# Patient Record
Sex: MALE | Race: BLACK OR AFRICAN AMERICAN | Employment: PART TIME | URBAN - METROPOLITAN AREA
[De-identification: names, ages, dates, MRNs, and addresses within clinical notes are randomized per-mention and may not be internally consistent; named-entity substitution may affect disease eponyms.]

---

## 2017-06-13 ENCOUNTER — OFFICE VISIT (OUTPATIENT)
Dept: FAMILY MEDICINE CLINIC | Age: 22
End: 2017-06-13

## 2017-06-13 VITALS
OXYGEN SATURATION: 100 % | TEMPERATURE: 98.5 F | HEIGHT: 69 IN | SYSTOLIC BLOOD PRESSURE: 123 MMHG | DIASTOLIC BLOOD PRESSURE: 67 MMHG | WEIGHT: 185 LBS | RESPIRATION RATE: 17 BRPM | HEART RATE: 90 BPM | BODY MASS INDEX: 27.4 KG/M2

## 2017-06-13 DIAGNOSIS — Z29.9 PREVENTIVE MEASURE: ICD-10-CM

## 2017-06-13 DIAGNOSIS — Z11.1 ENCOUNTER FOR SCREENING FOR RESPIRATORY TUBERCULOSIS: ICD-10-CM

## 2017-06-13 DIAGNOSIS — Z23 ENCOUNTER FOR IMMUNIZATION: ICD-10-CM

## 2017-06-13 DIAGNOSIS — Z00.00 ANNUAL PHYSICAL EXAM: Primary | ICD-10-CM

## 2017-06-13 DIAGNOSIS — K50.918 CROHN'S DISEASE WITH OTHER COMPLICATION: ICD-10-CM

## 2017-06-13 NOTE — PROGRESS NOTES
Chief Complaint   Patient presents with    Follow-up     here for Hep B series     1. Have you been to the ER, urgent care clinic since your last visit? Hospitalized since your last visit? No    2. Have you seen or consulted any other health care providers outside of the [address] [place] [address] since your last visit? Include any pap smears or colon screening. No  Rukhsana Aguilar 1995 . male who presents for routine immunizations. he denies any symptoms , reactions or allergies that would exclude them from being immunized today. Risks and adverse reactions were discussed and the VIS was given to them. All questions were addressed. Order placed for Hepatitis B,  per Verbal Order from Dr Yajaira Stevens with read back. Patient was observed for appropriate time post injection. There were no reactions observed. Sanjiv Chong.  David Escalera LPN

## 2017-06-13 NOTE — PATIENT INSTRUCTIONS
1.) Stop by the office during the week of July 13th for second Hep B vaccination. You can come during Nurse's hours: 9 am to 12 om and 2 pm to 4 pm, Monday through Friday. 2.) Return in 6 months for regular follow up and for next follow appointment.

## 2017-06-13 NOTE — PROGRESS NOTES
Progress Note    Patient: Angela Cornelius MRN: [id number]  SSN: [id number]    YOB: 1995  Age: 20 y.o. Sex: male          Subjective:   Angela Cornelius is a 20 y.o. male who is here for follow up. 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 The patient sees Dr. Obed Block for GI management. The patient is on Euceris and Remicade for his Crohn's. The patient mentions that he would like to get the Hep B series performed. He denies any hospitalizations since last visit. The patient mentions that the last time he did a colonoscopy since 2011. He states that it has been a few years since his last colonoscopy. Patient does not mention any chest pain, abdominal pain or shortness of breath. Objective:     Past Medical History:   Diagnosis Date    [facility]         Vitals:    06/13/17 1313   BP: 123/67   Pulse: 90   Resp: 17   Temp: 98.5 °F (36.9 °C)   TempSrc: Oral   SpO2: 100%   Weight: 185 lb (83.9 kg)   Height: 5' 9\" (1.753 m)            Review of Systems:  Pertinent items are noted in the History of Present Illness. Physical Exam:   GENERAL: alert, cooperative, no distress, appears stated age  EYE: conjunctivae/corneas clear. PERRL, EOM's intact. Fundi benign  LYMPHATIC: Cervical, supraclavicular, and axillary nodes normal.   THROAT & NECK: normal and no erythema or exudates noted. LUNG: clear to auscultation bilaterally  HEART: regular rate and rhythm, S1, S2 normal, no murmur, click, rub or gallop  ABDOMEN: soft, non-tender. Bowel sounds normal. No masses,  no organomegaly  EXTREMITIES:  extremities normal, atraumatic, no cyanosis or edema  SKIN: Normal.  NEUROLOGIC: negative  PSYCHIATRIC: non focal    Lab/Data Review:  Labs ordered as noted below        Assessment:     1.) Annual Physical: No acute findings on exam. Labs ordered as noted below. 2.) Crohn's Disease: Patient will continue with GI.  I will also get Quantiferon GOLD and Hepatitis test.     Patient will return in 6 months for follow up.      Plan:     Orders Placed This Encounter    Hepatitis B vaccine, adult dosage, IM    CBC WITH AUTOMATED DIFF    METABOLIC PANEL, COMPREHENSIVE    HEMOGLOBIN A1C WITH EAG    LIPID CASCADE W/REFLEX APO B    TSH 3RD GENERATION    T4, FREE    VITAMIN D, 25 HYDROXY    HEP B SURFACE AG    QUANTIFERON TB GOLD(CLIENT INCUB.)    Omega-3 Fatty Acids (FISH OIL) 300 mg cap    budesonide (UCERIS) 2 mg/actuation foam           Signed By: Ghulam Varner DO     June 13, 2017

## 2017-06-13 NOTE — MR AVS SNAPSHOT
Visit Information Date & Time Provider Department Dept. Phone Encounter #  
 6/13/2017  1:00 PM [id number] Betty Martell 77 [id number] Follow-up Instructions Return in about 6 months (around 12/13/2017). Upcoming Health Maintenance Date Due DTaP/Tdap/Td series (1 - Tdap) 4/18/2015 INFLUENZA AGE 9 TO ADULT 8/1/2017 Allergies as of 6/13/2017  Review Complete On: 4/95/6772 By: Uziel Mehta. Bubba Lutheran, LPN No Known Allergies Current Immunizations  Never Reviewed No immunizations on file. Not reviewed this visit You Were Diagnosed With   
  
 Codes Comments Annual physical exam    -  Primary ICD-10-CM: Z00.00 ICD-9-CM: V70.0 Crohn's disease with other complication ([facility]     HTQ-91-KR: G95.078 ICD-9-CM: 555.9 Encounter for screening for respiratory tuberculosis     ICD-10-CM: Z11.1 ICD-9-CM: V74.1 Preventive measure     ICD-10-CM: Z29.9 ICD-9-CM: V07.9 Encounter for immunization     ICD-10-CM: T09 ICD-9-CM: V03.89 Vitals BP Pulse Temp Resp Height(growth percentile) Weight(growth percentile) 123/67 (BP 1 Location: Right arm, BP Patient Position: Sitting) 90 98.5 °F (36.9 °C) (Oral) 17 5' 9\" (1.753 m) 185 lb (83.9 kg) SpO2 BMI Smoking Status 100% 27.32 kg/m2 Never Smoker BMI and BSA Data Body Mass Index Body Surface Area  
 27.32 kg/m 2 2.02 m 2 Preferred Pharmacy Pharmacy Name Phone [address] [place] [address] [phone] [facility] [phone] Your Updated Medication List  
  
   
This list is accurate as of: 6/13/17  1:37 PM.  Always use your most recent med list.  
  
  
  
  
 CALTRATE 600+D PLUS MINERALS 600 mg calcium- 400 unit Tab Generic drug:  Calcium Carbonate-Vit D3-Min Take  by mouth. FISH  mg Cap Generic drug:  Omega-3 Fatty Acids Take  by mouth.   
  
 REMICADE IV  
 by IntraVENous route. Every 2 months UCERIS 2 mg/actuation Foam  
Generic drug:  budesonide Insert  into rectum. VITAMIN C 500 mg tablet Generic drug:  ascorbic acid (vitamin C) Take  by mouth. VITAMIN D2 50,000 unit capsule Generic drug:  ergocalciferol Take 50,000 Units by mouth every thirty (30) days. Follow-up Instructions Return in about 6 months (around 12/13/2017). To-Do List   
 06/13/2017 Lab:  HEMOGLOBIN A1C WITH EAG   
  
 06/13/2017 Lab:  HEP B SURFACE AG   
  
 06/13/2017 Lab:  LIPID CASCADE W/REFLEX APO B   
  
 06/13/2017 Lab:  QUANTIFERON TB GOLD(CLIENT INCUB.)   
  
 06/13/2017 Lab:  VITAMIN D, 25 HYDROXY Around 09/11/2017 Lab:  CBC WITH AUTOMATED DIFF Around 09/11/2017 Lab:  METABOLIC PANEL, COMPREHENSIVE Around 09/11/2017 Lab:  T4, FREE Around 09/11/2017 Lab:  TSH 3RD GENERATION Patient Instructions 1.) Stop by the office during the week of July 13th for second Hep B vaccination. You can come during Nurse's hours: 9 am to 12 om and 2 pm to 4 pm, Monday through Friday. 2.) Return in 6 months for regular follow up and for next follow appointment. Introducing \[facility]\"" & HEALTH SERVICES! [facility] introduces [employer] patient portal. Now you can access parts of your medical record, email your doctor's office, and request medication refills online. 1. In your internet browser, go to [email or web address] 2. Click on the First Time User? Click Here link in the Sign In box. You will see the New Member Sign Up page. 3. Enter your [employer] Access Code exactly as it appears below. You will not need to use this code after youve completed the sign-up process. If you do not sign up before the expiration date, you must request a new code. · [employer] Access Code: [email or web address] Expires: 9/11/2017 12:57 PM 
 
 4. Enter the last four digits of your Social Security Number (xxxx) and Date of Birth (mm/dd/yyyy) as indicated and click Submit. You will be taken to the next sign-up page. 5. Create a iConnect CRM ID. This will be your iConnect CRM login ID and cannot be changed, so think of one that is secure and easy to remember. 6. Create a iConnect CRM password. You can change your password at any time. 7. Enter your Password Reset Question and Answer. This can be used at a later time if you forget your password. 8. Enter your e-mail address. You will receive e-mail notification when new information is available in [address] 9. Click Sign Up. You can now view and download portions of your medical record. 10. Click the Download Summary menu link to download a portable copy of your medical information. If you have questions, please visit the Frequently Asked Questions section of the iConnect CRM website. Remember, iConnect CRM is NOT to be used for urgent needs. For medical emergencies, dial 911. Now available from your iPhone and Android! Please provide this summary of care documentation to your next provider. Your primary care clinician is listed as [phone] Rahat Bustamante. If you have any questions after today's visit, please call [phone].

## 2017-06-19 LAB
25(OH)D3 SERPL-MCNC: 32.8 NG/ML (ref 32–100)
A-G RATIO,AGRAT: 1.2 RATIO (ref 1.1–2.6)
ABSOLUTE LYMPHOCYTE COUNT, 10803: 2.6 K/UL (ref 1–4.8)
ALBUMIN SERPL-MCNC: 4.4 G/DL (ref 3.5–5)
ALP SERPL-CCNC: 43 U/L (ref 25–115)
ALT SERPL-CCNC: 7 U/L (ref 5–40)
ANION GAP SERPL CALC-SCNC: 16 MMOL/L
APOLIPOPROTEIN B: 66 MG/DL
AST SERPL W P-5'-P-CCNC: 10 U/L (ref 10–37)
AVG GLU, 10930: 90 MG/DL (ref 91–123)
BASOPHILS # BLD: 0 K/UL (ref 0–0.2)
BASOPHILS NFR BLD: 1 % (ref 0–2)
BILIRUB SERPL-MCNC: 0.5 MG/DL (ref 0.2–1.2)
BUN SERPL-MCNC: 11 MG/DL (ref 6–22)
CALCIUM SERPL-MCNC: 9.3 MG/DL (ref 8.4–10.4)
CHLORIDE SERPL-SCNC: 99 MMOL/L (ref 98–110)
CHOLEST SERPL-MCNC: 130 MG/DL
CO2 SERPL-SCNC: 28 MMOL/L (ref 20–32)
CREAT SERPL-MCNC: 0.9 MG/DL (ref 0.5–1.2)
EOSINOPHIL # BLD: 0.1 K/UL (ref 0–0.5)
EOSINOPHIL NFR BLD: 1 % (ref 0–6)
ERYTHROCYTE [DISTWIDTH] IN BLOOD BY AUTOMATED COUNT: 13.9 % (ref 10–16)
GFRAA, 66117: >60
GFRNA, 66118: >60
GLOBULIN,GLOB: 3.8 G/DL (ref 2–4)
GLUCOSE SERPL-MCNC: 87 MG/DL (ref 65–99)
GRANULOCYTES,GRANS: 60 % (ref 40–75)
HBA1C MFR BLD HPLC: 4.8 % (ref 4.8–5.9)
HCT VFR BLD AUTO: 42.5 % (ref 36.6–51.9)
HDLC SERPL-MCNC: 57 MG/DL
HEP B SURFACE AG SCRN, 006510: NORMAL
HGB BLD-MCNC: 15 G/DL (ref 13.2–17.3)
INTERP QFT, 13875: NORMAL
LDL/HDL RATIO,LDHD: 1.1 RATIO UNITS
LDLC SERPL CALC-MCNC: 63 MG/DL
LYMPHOCYTES, LYMLT: 34 % (ref 27–45)
MCH RBC QN AUTO: 30 PG (ref 26–34)
MCHC RBC AUTO-ENTMCNC: 35 G/DL (ref 32–36)
MCV RBC AUTO: 84 FL (ref 80–95)
MONOCYTES # BLD: 0.4 K/UL (ref 0.1–0.9)
MONOCYTES NFR BLD: 5 % (ref 3–9)
NEUTROPHILS # BLD AUTO: 4.7 K/UL (ref 1.8–7.7)
NON-HDL CHOLESTEROL, 011976: 73 MG/DL
PLATELET # BLD AUTO: 232 K/UL (ref 140–440)
PMV BLD AUTO: 11.2 FL (ref 6–10.8)
POTASSIUM SERPL-SCNC: 4.4 MMOL/L (ref 3.5–5.5)
PROT SERPL-MCNC: 8.2 G/DL (ref 6.4–8.3)
QFT AG VA, 13871: 0.06 IU/ML
QFT CALC, 13874: 0.02 IU/ML
QFT MIT VA, 13873: >10 IU/ML
QFT NIL VA, 13872: 0.04 IU/ML
QFT POS CR, 13870: NORMAL
QFT TB GOL, 13869: NEGATIVE
RBC # BLD AUTO: 5.05 M/UL (ref 3.8–5.8)
SODIUM SERPL-SCNC: 143 MMOL/L (ref 133–145)
T4 FREE SERPL-MCNC: 1.1 NG/DL (ref 0.9–1.8)
TRIGL SERPL-MCNC: 52 MG/DL (ref ?–150)
TSH SERPL DL<=0.005 MIU/L-ACNC: 1.06 MCU/ML (ref 0.27–4.2)
WBC # BLD AUTO: 7.8 K/UL (ref 4–11)

## 2017-07-17 ENCOUNTER — CLINICAL SUPPORT (OUTPATIENT)
Dept: FAMILY MEDICINE CLINIC | Age: 22
End: 2017-07-17

## 2017-07-17 VITALS — TEMPERATURE: 97.8 F

## 2017-07-17 DIAGNOSIS — Z23 ENCOUNTER FOR IMMUNIZATION: Primary | ICD-10-CM

## 2017-07-17 NOTE — PROGRESS NOTES
Sushila Signs 1995 male who presents for routine immunizations. Patient denies any symptoms , reactions or allergies that would exclude them from being immunized today. Risks and adverse reactions were discussed and the VIS was given to them. All questions were addressed. Order placed for 2ND HEP B,  per Verbal Order from DR. COSTA with read back. Patient was observed for 15 min post injection. There were no reactions observed.     Ju Swift LPN

## 2018-01-18 ENCOUNTER — OFFICE VISIT (OUTPATIENT)
Dept: FAMILY MEDICINE CLINIC | Age: 23
End: 2018-01-18

## 2018-01-18 VITALS
OXYGEN SATURATION: 98 % | HEIGHT: 69 IN | BODY MASS INDEX: 26.07 KG/M2 | DIASTOLIC BLOOD PRESSURE: 66 MMHG | WEIGHT: 176 LBS | RESPIRATION RATE: 16 BRPM | TEMPERATURE: 98.4 F | SYSTOLIC BLOOD PRESSURE: 112 MMHG | HEART RATE: 60 BPM

## 2018-01-18 DIAGNOSIS — L02.91 CUTANEOUS ABSCESS, UNSPECIFIED SITE: Primary | ICD-10-CM

## 2018-01-18 RX ORDER — SULFAMETHOXAZOLE AND TRIMETHOPRIM 800; 160 MG/1; MG/1
1 TABLET ORAL 2 TIMES DAILY
Qty: 14 TAB | Refills: 0 | Status: SHIPPED | OUTPATIENT
Start: 2018-01-18 | End: 2018-01-25

## 2018-01-18 NOTE — MR AVS SNAPSHOT
Nathalia Quiñones 
 
 
 [place] [address] [address] [address] [address] [phone] Patient: Uzair Kinsey MRN: [id number] :1995 Visit Information Date & Time Provider Department Dept. Phone Encounter #  
 2018  1:45 PM Nate [facility] [id number] Follow-up Instructions Return if symptoms worsen or fail to improve. Upcoming Health Maintenance Date Due DTaP/Tdap/Td series (1 - Tdap) 2015 Influenza Age 5 to Adult 2017 Allergies as of 2018  Review Complete On:  By: Jatinder Ryan. Kiko James LPN No Known Allergies Current Immunizations  Never Reviewed Name Date Hep B Vaccine (Adult) 2017, 2017  2:10 PM  
  
 Not reviewed this visit You Were Diagnosed With   
  
 Codes Comments Cutaneous abscess, unspecified site    -  Primary ICD-10-CM: L02.91 
ICD-9-CM: 682. 9 Vitals BP Pulse Temp Resp Height(growth percentile) Weight(growth percentile) 112/66 (BP 1 Location: Right arm, BP Patient Position: Sitting) 60 98.4 °F (36.9 °C) (Oral) 16 5' 9\" (1.753 m) 176 lb (79.8 kg) SpO2 BMI Smoking Status 98% 25.99 kg/m2 Never Smoker BMI and BSA Data Body Mass Index Body Surface Area  
 25.99 kg/m 2 1.97 m 2 Preferred Pharmacy Pharmacy Name Phone [address] [facility] [address] [place] [address] 5922 [facility] [phone] Your Updated Medication List  
  
   
This list is accurate as of: 18  2:26 PM.  Always use your most recent med list.  
  
  
  
  
 CALTRATE 600+D PLUS MINERALS 600 mg calcium- 400 unit Tab Generic drug:  Calcium Carbonate-Vit D3-Min Take  by mouth. FISH  mg Cap Generic drug:  Omega-3 Fatty Acids Take  by mouth. ONE-A-DAY MEN'S MULTIVITAMIN PO Take  by mouth. REMICADE IV  
by IntraVENous route. Every 2 months trimethoprim-sulfamethoxazole 160-800 mg per tablet Commonly known as:  BACTRIM DS Take 1 Tab by mouth two (2) times a day for 7 days. Indications: Abscess UCERIS 2 mg/actuation Foam  
Generic drug:  budesonide Insert  into rectum. VITAMIN C 500 mg tablet Generic drug:  ascorbic acid (vitamin C) Take  by mouth. VITAMIN D2 50,000 unit capsule Generic drug:  ergocalciferol Take 50,000 Units by mouth every thirty (30) days. Prescriptions Sent to Pharmacy Refills  
 trimethoprim-sulfamethoxazole (BACTRIM DS) 160-800 mg per tablet 0 Sig: Take 1 Tab by mouth two (2) times a day for 7 days. Indications: Abscess Class: Normal  
 Pharmacy: [address] [place] [address] #: [phone] Route: Oral  
  
Follow-up Instructions Return if symptoms worsen or fail to improve. Patient Instructions Please contact our office if you have any questions about your visit today. Introducing [facility] & HEALTH SERVICES! [employer] introduces [employer] patient portal. Now you can access parts of your medical record, email your doctor's office, and request medication refills online. 1. In your internet browser, go to [email or web address] 2. Click on the First Time User? Click Here link in the Sign In box. You will see the New Member Sign Up page. 3. Enter your Luminal Access Code exactly as it appears below. You will not need to use this code after youve completed the sign-up process. If you do not sign up before the expiration date, you must request a new code. · Luminal Access Code: [email or web address] Expires: 4/18/2018  1:33 PM 
 
4. Enter the last four digits of your Social Security Number (xxxx) and Date of Birth (mm/dd/yyyy) as indicated and click Submit. You will be taken to the next sign-up page. 5. Create a Universal Roboticst ID.  This will be your Universal Roboticst login ID and cannot be changed, so think of one that is secure and easy to remember. 6. Create a IDbyME password. You can change your password at any time. 7. Enter your Password Reset Question and Answer. This can be used at a later time if you forget your password. 8. Enter your e-mail address. You will receive e-mail notification when new information is available in [address] 9. Click Sign Up. You can now view and download portions of your medical record. 10. Click the Download Summary menu link to download a portable copy of your medical information. If you have questions, please visit the Frequently Asked Questions section of the IDbyME website. Remember, IDbyME is NOT to be used for urgent needs. For medical emergencies, dial 911. Now available from your iPhone and Android! Please provide this summary of care documentation to your next provider. Your primary care clinician is listed as January Carter. If you have any questions after today's visit, please call [phone].

## 2018-01-18 NOTE — PROGRESS NOTES
Progress Note    Patient: Teri Reis MRN: [id number]  SSN: [id number]    YOB: 1995  Age: 20 y.o. Sex: male          Subjective:   Teri Reis is a 20 y.o. male who is here for an acute care visit for axillary abscesses. He developed abscess the beginning of September and then they started draining. He states that in the beginning of November he developed another abscess in the right axilla and another in the left. He was later prescribed an antibiotic by his GI specialist. He developed another abscess in the left axilla in mid December. He noticed that around January 6th he developed another abscess. The patient mentions that he does get some abscesses in his groin area from time to time but these usually resolve. Visit from 6/13/2017  The patient sees Dr. Joon Hsieh for GI management. The patient is on Euceris and Remicade for his Crohn's. The patient mentions that he would like to get the Hep B series performed. He denies any hospitalizations since last visit. The patient mentions that the last time he did a colonoscopy since 2011. He states that it has been a few years since his last colonoscopy. Patient does not mention any chest pain, abdominal pain or shortness of breath. Objective:     Past Medical History:   Diagnosis Date    [facility]         Vitals:    01/18/18 1358   BP: 112/66   Pulse: 60   Resp: 16   Temp: 98.4 °F (36.9 °C)   TempSrc: Oral   SpO2: 98%   Weight: 176 lb (79.8 kg)   Height: 5' 9\" (1.753 m)            Review of Systems:  Pertinent items are noted in the History of Present Illness. Physical Exam:   GENERAL: alert, cooperative, no distress, appears stated age  EYE: conjunctivae/corneas clear. PERRL, EOM's intact. Fundi benign  LYMPHATIC: Cervical, supraclavicular, and axillary nodes normal.   THROAT & NECK: normal and no erythema or exudates noted.    LUNG: clear to auscultation bilaterally  HEART: regular rate and rhythm, S1, S2 normal, no murmur, click, rub or gallop  ABDOMEN: soft, non-tender. Bowel sounds normal. No masses,  no organomegaly  EXTREMITIES:  extremities normal, atraumatic, no cyanosis or edema  SKIN: Abscess in the left axilla noted without any active drainage. Lab/Data Review:  No new labs to review     Assessment:     1.) Skin Abscesses: Patient placed on Bactrim for 7 days. 2.) Crohn's Disease: Patient will continue with GI. Patient will return as needed.      Plan:     Orders Placed This Encounter    MULTIVIT-MIN/FOLIC/VIT K/LYCOP (ONE-A-DAY MEN'S MULTIVITAMIN PO)    trimethoprim-sulfamethoxazole (BACTRIM DS) 160-800 mg per tablet           Signed By: Julito Gordon DO     January 18, 2018

## 2018-01-18 NOTE — PROGRESS NOTES
Chief Complaint   Patient presents with    Follow-up    Skin Problem     states that he has had multiple boils/abscesses and has been treated  for all last one was beginning of month     1. Have you been to the ER, urgent care clinic since your last visit? Hospitalized since your last visit? No    2. Have you seen or consulted any other health care providers outside of the [address] [place] [address] since your last visit? Include any pap smears or colon screening.  No

## 2018-10-29 ENCOUNTER — OFFICE VISIT (OUTPATIENT)
Dept: FAMILY MEDICINE CLINIC | Age: 23
End: 2018-10-29

## 2020-03-30 RX ORDER — HYDROCORTISONE SODIUM SUCCINATE 100 MG/2ML
100 INJECTION, POWDER, FOR SOLUTION INTRAMUSCULAR; INTRAVENOUS AS NEEDED
Status: CANCELLED | OUTPATIENT
Start: 2020-04-03

## 2020-03-30 RX ORDER — HEPARIN 100 UNIT/ML
300-500 SYRINGE INTRAVENOUS AS NEEDED
Status: CANCELLED
Start: 2020-04-03

## 2020-03-30 RX ORDER — EPINEPHRINE 1 MG/ML
0.3 INJECTION, SOLUTION, CONCENTRATE INTRAVENOUS AS NEEDED
Status: CANCELLED | OUTPATIENT
Start: 2020-04-03

## 2020-03-30 RX ORDER — ACETAMINOPHEN 325 MG/1
650 TABLET ORAL AS NEEDED
Status: CANCELLED
Start: 2020-04-03

## 2020-03-30 RX ORDER — DIPHENHYDRAMINE HYDROCHLORIDE 50 MG/ML
50 INJECTION, SOLUTION INTRAMUSCULAR; INTRAVENOUS AS NEEDED
Status: CANCELLED
Start: 2020-04-03

## 2020-03-30 RX ORDER — DIPHENHYDRAMINE HYDROCHLORIDE 50 MG/ML
25 INJECTION, SOLUTION INTRAMUSCULAR; INTRAVENOUS AS NEEDED
Status: CANCELLED
Start: 2020-04-03

## 2020-03-30 RX ORDER — ALBUTEROL SULFATE 0.83 MG/ML
2.5 SOLUTION RESPIRATORY (INHALATION) AS NEEDED
Status: CANCELLED
Start: 2020-04-03

## 2020-03-30 RX ORDER — ONDANSETRON 2 MG/ML
8 INJECTION INTRAMUSCULAR; INTRAVENOUS AS NEEDED
Status: CANCELLED | OUTPATIENT
Start: 2020-04-03

## 2020-04-03 ENCOUNTER — HOSPITAL ENCOUNTER (OUTPATIENT)
Dept: INFUSION THERAPY | Age: 25
Discharge: HOME | End: 2020-04-03
Payer: COMMERCIAL

## 2020-04-03 VITALS
WEIGHT: 174.6 LBS | BODY MASS INDEX: 25.86 KG/M2 | TEMPERATURE: 98.2 F | OXYGEN SATURATION: 100 % | RESPIRATION RATE: 18 BRPM | DIASTOLIC BLOOD PRESSURE: 62 MMHG | SYSTOLIC BLOOD PRESSURE: 118 MMHG | HEART RATE: 82 BPM | HEIGHT: 69 IN

## 2020-04-03 DIAGNOSIS — K50.00 CROHN'S DISEASE OF SMALL INTESTINE WITHOUT COMPLICATION (HCC): ICD-10-CM

## 2020-04-03 DIAGNOSIS — K50.00 CROHN'S DISEASE OF SMALL INTESTINE WITHOUT COMPLICATION (HCC): Primary | ICD-10-CM

## 2020-04-03 PROCEDURE — 96375 TX/PRO/DX INJ NEW DRUG ADDON: CPT

## 2020-04-03 PROCEDURE — 74011250636 HC RX REV CODE- 250/636

## 2020-04-03 PROCEDURE — 96415 CHEMO IV INFUSION ADDL HR: CPT

## 2020-04-03 PROCEDURE — 74011250637 HC RX REV CODE- 250/637

## 2020-04-03 PROCEDURE — 96413 CHEMO IV INFUSION 1 HR: CPT

## 2020-04-03 PROCEDURE — 96411 CHEMO IV PUSH ADDL DRUG: CPT

## 2020-04-03 RX ORDER — ONDANSETRON 2 MG/ML
8 INJECTION INTRAMUSCULAR; INTRAVENOUS AS NEEDED
Status: CANCELLED | OUTPATIENT
Start: 2020-05-12

## 2020-04-03 RX ORDER — DIPHENHYDRAMINE HYDROCHLORIDE 50 MG/ML
50 INJECTION, SOLUTION INTRAMUSCULAR; INTRAVENOUS ONCE
Status: CANCELLED
Start: 2020-05-12

## 2020-04-03 RX ORDER — SODIUM CHLORIDE 0.9 % (FLUSH) 0.9 %
10 SYRINGE (ML) INJECTION AS NEEDED
Status: CANCELLED
Start: 2020-05-12

## 2020-04-03 RX ORDER — SODIUM CHLORIDE 0.9 % (FLUSH) 0.9 %
10 SYRINGE (ML) INJECTION AS NEEDED
Status: DISPENSED | OUTPATIENT
Start: 2020-04-03 | End: 2020-04-03

## 2020-04-03 RX ORDER — ALBUTEROL SULFATE 0.83 MG/ML
2.5 SOLUTION RESPIRATORY (INHALATION) AS NEEDED
Status: CANCELLED
Start: 2020-05-12

## 2020-04-03 RX ORDER — SODIUM CHLORIDE 9 MG/ML
10 INJECTION INTRAMUSCULAR; INTRAVENOUS; SUBCUTANEOUS AS NEEDED
Status: CANCELLED | OUTPATIENT
Start: 2020-05-12

## 2020-04-03 RX ORDER — DIPHENHYDRAMINE HYDROCHLORIDE 50 MG/ML
50 INJECTION, SOLUTION INTRAMUSCULAR; INTRAVENOUS ONCE
Status: COMPLETED | OUTPATIENT
Start: 2020-04-03 | End: 2020-04-03

## 2020-04-03 RX ORDER — DIPHENHYDRAMINE HYDROCHLORIDE 50 MG/ML
25 INJECTION, SOLUTION INTRAMUSCULAR; INTRAVENOUS AS NEEDED
Status: CANCELLED
Start: 2020-05-12

## 2020-04-03 RX ORDER — SODIUM CHLORIDE 9 MG/ML
10 INJECTION INTRAMUSCULAR; INTRAVENOUS; SUBCUTANEOUS AS NEEDED
Status: ACTIVE | OUTPATIENT
Start: 2020-04-03 | End: 2020-04-03

## 2020-04-03 RX ORDER — HYDROCORTISONE SODIUM SUCCINATE 100 MG/2ML
100 INJECTION, POWDER, FOR SOLUTION INTRAMUSCULAR; INTRAVENOUS AS NEEDED
Status: CANCELLED | OUTPATIENT
Start: 2020-05-12

## 2020-04-03 RX ORDER — SODIUM CHLORIDE 9 MG/ML
25 INJECTION, SOLUTION INTRAVENOUS CONTINUOUS
Status: DISPENSED | OUTPATIENT
Start: 2020-04-03 | End: 2020-04-03

## 2020-04-03 RX ORDER — ACETAMINOPHEN 325 MG/1
650 TABLET ORAL ONCE
Status: COMPLETED | OUTPATIENT
Start: 2020-04-03 | End: 2020-04-03

## 2020-04-03 RX ORDER — DIPHENHYDRAMINE HYDROCHLORIDE 50 MG/ML
50 INJECTION, SOLUTION INTRAMUSCULAR; INTRAVENOUS AS NEEDED
Status: CANCELLED
Start: 2020-05-12

## 2020-04-03 RX ORDER — SODIUM CHLORIDE 9 MG/ML
25 INJECTION, SOLUTION INTRAVENOUS CONTINUOUS
Status: CANCELLED | OUTPATIENT
Start: 2020-05-12

## 2020-04-03 RX ORDER — ACETAMINOPHEN 325 MG/1
650 TABLET ORAL ONCE
Status: CANCELLED
Start: 2020-05-12

## 2020-04-03 RX ORDER — EPINEPHRINE 1 MG/ML
0.3 INJECTION, SOLUTION, CONCENTRATE INTRAVENOUS AS NEEDED
Status: CANCELLED | OUTPATIENT
Start: 2020-05-12

## 2020-04-03 RX ORDER — ACETAMINOPHEN 325 MG/1
650 TABLET ORAL AS NEEDED
Status: CANCELLED
Start: 2020-05-12

## 2020-04-03 RX ORDER — HEPARIN 100 UNIT/ML
300-500 SYRINGE INTRAVENOUS AS NEEDED
Status: CANCELLED
Start: 2020-05-12

## 2020-04-03 RX ADMIN — SODIUM CHLORIDE 25 ML/HR: 9 INJECTION, SOLUTION INTRAVENOUS at 08:25

## 2020-04-03 RX ADMIN — ACETAMINOPHEN 650 MG: 325 TABLET ORAL at 09:28

## 2020-04-03 RX ADMIN — Medication 10 ML: at 08:25

## 2020-04-03 RX ADMIN — DIPHENHYDRAMINE HYDROCHLORIDE 50 MG: 50 INJECTION INTRAMUSCULAR; INTRAVENOUS at 08:30

## 2020-04-03 NOTE — PROGRESS NOTES
[facility] Progress Note    Date: April 3, 2020    Name: Patricia Suresh    MRN: [id number]         : 1995    Remicade q 6 weeks      Mr. Nagi Ibanez arrived to [facility] at 4656. No concerns or complaints voiced. Denies current or recent infections. States he had remicade before and tolerated well. Mr. Nagi Ibanez was assessed and education was provided. Care notes given. He verbalized understanding. He had hep B and TB testing done in the recent past    Mr. Reyes's vitals were reviewed. Visit Vitals  /62 (BP 1 Location: Right arm, BP Patient Position: At rest)   Pulse 82   Temp 98.2 °F (36.8 °C)   Resp 18   Ht 5' 9\" (1.753 m)   Wt 79.2 kg (174 lb 9.6 oz)   SpO2 100%   BMI 25.78 kg/m²       # 22g IV inserted in patient's LEFT AC x1 attempt. Positive for blood return/ flushes without difficulty. Pre medications tylenol PO and benadryl IV were given     Remicade 800 mg administered over 2.5 hours by titration of 10 ml, 20 ml, 40 ml, 80 ml/hr Q 15 minutes, then 125 ml/hr for remainder of infusion followed by NS flush. Mr. Nagi Ibanez tolerated well without complaints. He declined to stay for observation    Patient Vitals for the past 8 hrs:   Temp Pulse Resp BP SpO2   20 1150 98.2 °F (36.8 °C) 82 18 118/62 100 %   20 1110 98 °F (36.7 °C) 66 16 102/56 --   20 1010 97 °F (36.1 °C) (!) 51 16 104/67 --   20 0955 98.3 °F (36.8 °C) 67 16 107/66 --   20 0940 98.2 °F (36.8 °C) 61 16 111/65 --   20 0925 98 °F (36.7 °C) 61 16 107/64 --   20 0815 98 °F (36.7 °C) (!) 59 18 116/73 98 %         IV removed/ intact. Site without swelling, irritation or tenderness. Gauze/ coban to site. Reviewed discharge instructions with patient. He verbalized understanding. Mr. Nagi Ibanez was discharged from [place] [address] in stable condition at 1200. He is to return on 5/15/2020 at 0800 for his next appointment.     Torrey Dexter RN  April 3, 2020

## 2020-05-10 DIAGNOSIS — K50.00 CROHN'S DISEASE OF SMALL INTESTINE WITHOUT COMPLICATION (HCC): ICD-10-CM

## 2020-05-15 ENCOUNTER — HOSPITAL ENCOUNTER (OUTPATIENT)
Dept: INFUSION THERAPY | Age: 25
Discharge: HOME | End: 2020-05-15
Payer: COMMERCIAL

## 2020-05-15 VITALS
RESPIRATION RATE: 18 BRPM | DIASTOLIC BLOOD PRESSURE: 61 MMHG | BODY MASS INDEX: 25.46 KG/M2 | WEIGHT: 171.9 LBS | HEIGHT: 69 IN | HEART RATE: 54 BPM | TEMPERATURE: 97.5 F | SYSTOLIC BLOOD PRESSURE: 110 MMHG | OXYGEN SATURATION: 100 %

## 2020-05-15 DIAGNOSIS — K50.00 CROHN'S DISEASE OF SMALL INTESTINE WITHOUT COMPLICATION (HCC): Primary | ICD-10-CM

## 2020-05-15 PROCEDURE — 96415 CHEMO IV INFUSION ADDL HR: CPT

## 2020-05-15 PROCEDURE — 96417 CHEMO IV INFUS EACH ADDL SEQ: CPT

## 2020-05-15 PROCEDURE — 74011250637 HC RX REV CODE- 250/637

## 2020-05-15 PROCEDURE — 74011250636 HC RX REV CODE- 250/636

## 2020-05-15 PROCEDURE — 96413 CHEMO IV INFUSION 1 HR: CPT

## 2020-05-15 PROCEDURE — 96375 TX/PRO/DX INJ NEW DRUG ADDON: CPT

## 2020-05-15 RX ORDER — ACETAMINOPHEN 325 MG/1
650 TABLET ORAL AS NEEDED
Status: CANCELLED
Start: 2020-06-21

## 2020-05-15 RX ORDER — SODIUM CHLORIDE 9 MG/ML
25 INJECTION, SOLUTION INTRAVENOUS CONTINUOUS
Status: DISPENSED | OUTPATIENT
Start: 2020-05-15 | End: 2020-05-15

## 2020-05-15 RX ORDER — DIPHENHYDRAMINE HYDROCHLORIDE 50 MG/ML
25 INJECTION, SOLUTION INTRAMUSCULAR; INTRAVENOUS AS NEEDED
Status: CANCELLED
Start: 2020-06-21

## 2020-05-15 RX ORDER — EPINEPHRINE 1 MG/ML
0.3 INJECTION, SOLUTION, CONCENTRATE INTRAVENOUS AS NEEDED
Status: CANCELLED | OUTPATIENT
Start: 2020-06-21

## 2020-05-15 RX ORDER — SODIUM CHLORIDE 9 MG/ML
25 INJECTION, SOLUTION INTRAVENOUS CONTINUOUS
Status: CANCELLED | OUTPATIENT
Start: 2020-06-21

## 2020-05-15 RX ORDER — ALBUTEROL SULFATE 0.83 MG/ML
2.5 SOLUTION RESPIRATORY (INHALATION) AS NEEDED
Status: CANCELLED
Start: 2020-06-21

## 2020-05-15 RX ORDER — SODIUM CHLORIDE 9 MG/ML
10 INJECTION INTRAMUSCULAR; INTRAVENOUS; SUBCUTANEOUS AS NEEDED
Status: CANCELLED | OUTPATIENT
Start: 2020-06-21

## 2020-05-15 RX ORDER — SODIUM CHLORIDE 0.9 % (FLUSH) 0.9 %
10 SYRINGE (ML) INJECTION AS NEEDED
Status: CANCELLED
Start: 2020-06-21

## 2020-05-15 RX ORDER — HEPARIN 100 UNIT/ML
300-500 SYRINGE INTRAVENOUS AS NEEDED
Status: CANCELLED
Start: 2020-06-21

## 2020-05-15 RX ORDER — ACETAMINOPHEN 325 MG/1
650 TABLET ORAL ONCE
Status: CANCELLED
Start: 2020-06-21

## 2020-05-15 RX ORDER — DIPHENHYDRAMINE HYDROCHLORIDE 50 MG/ML
50 INJECTION, SOLUTION INTRAMUSCULAR; INTRAVENOUS AS NEEDED
Status: CANCELLED
Start: 2020-06-21

## 2020-05-15 RX ORDER — DIPHENHYDRAMINE HYDROCHLORIDE 50 MG/ML
50 INJECTION, SOLUTION INTRAMUSCULAR; INTRAVENOUS ONCE
Status: COMPLETED | OUTPATIENT
Start: 2020-05-15 | End: 2020-05-15

## 2020-05-15 RX ORDER — ONDANSETRON 2 MG/ML
8 INJECTION INTRAMUSCULAR; INTRAVENOUS AS NEEDED
Status: CANCELLED | OUTPATIENT
Start: 2020-06-21

## 2020-05-15 RX ORDER — DIPHENHYDRAMINE HYDROCHLORIDE 50 MG/ML
50 INJECTION, SOLUTION INTRAMUSCULAR; INTRAVENOUS ONCE
Status: CANCELLED
Start: 2020-06-21

## 2020-05-15 RX ORDER — HYDROCORTISONE SODIUM SUCCINATE 100 MG/2ML
100 INJECTION, POWDER, FOR SOLUTION INTRAMUSCULAR; INTRAVENOUS AS NEEDED
Status: CANCELLED | OUTPATIENT
Start: 2020-06-21

## 2020-05-15 RX ORDER — ACETAMINOPHEN 325 MG/1
650 TABLET ORAL ONCE
Status: COMPLETED | OUTPATIENT
Start: 2020-05-15 | End: 2020-05-15

## 2020-05-15 RX ADMIN — ACETAMINOPHEN 650 MG: 325 TABLET ORAL at 08:25

## 2020-05-15 RX ADMIN — SODIUM CHLORIDE 25 ML/HR: 9 INJECTION, SOLUTION INTRAVENOUS at 08:29

## 2020-05-15 RX ADMIN — DIPHENHYDRAMINE HYDROCHLORIDE 50 MG: 50 INJECTION INTRAMUSCULAR; INTRAVENOUS at 08:26

## 2020-05-15 NOTE — PROGRESS NOTES
[facility] Progress Note    Date: May 15, 2020    Name: Meliza Khan    MRN: [id number]         : 1995    Remicade q 6 weeks      Mr. Mark Simmons arrived to [facility] at 1881. No concerns or complaints voiced. Denies current or recent infections. States he had remicade before and tolerated well. Mr. Mark Simmons was assessed and education was provided. Care notes given. He verbalized understanding. He had hep B and TB testing done in the recent past    Mr. Reyes's vitals were reviewed. Visit Vitals  /61 (BP 1 Location: Right arm, BP Patient Position: Sitting)   Pulse (!) 54   Temp 97.5 °F (36.4 °C)   Resp 18   Ht 5' 9\" (1.753 m)   Wt 78 kg (171 lb 14.4 oz)   SpO2 100%   BMI 25.39 kg/m²       # 22g IV inserted in patient's Right AC x1 attempt. Positive for blood return/ flushes without difficulty. Pre medications tylenol PO and benadryl IV were given     Remicade 800 mg administered over 2 hours at a rate of 125 ml/hr for  infusion followed by NS flush. Mr. Mark Simmons tolerated well without complaints. He declined to stay for observation    Patient Vitals for the past 8 hrs:   Temp Pulse Resp BP SpO2   05/15/20 1111 97.5 °F (36.4 °C) (!) 54 18 110/61 100 %   05/15/20 0910 98 °F (36.7 °C) (!) 53 18 114/58 99 %   05/15/20 0810 98 °F (36.7 °C) 63 18 120/57 100 %         IV removed/ intact. Site without swelling, irritation or tenderness. Gauze/ coban to site. Reviewed discharge instructions with patient. He verbalized understanding. Mr. Mark Simmons was discharged from [place] [address] in stable condition at 1115. He is to follow up with PCP due to Insurance changes.      Jackie Butler RN  May 15, 2020

## 2020-06-21 DIAGNOSIS — K50.00 CROHN'S DISEASE OF SMALL INTESTINE WITHOUT COMPLICATION (HCC): ICD-10-CM

## 2020-08-02 DIAGNOSIS — K50.00 CROHN'S DISEASE OF SMALL INTESTINE WITHOUT COMPLICATION (HCC): ICD-10-CM

## 2020-09-13 DIAGNOSIS — K50.00 CROHN'S DISEASE OF SMALL INTESTINE WITHOUT COMPLICATION (HCC): ICD-10-CM

## 2020-10-25 DIAGNOSIS — K50.00 CROHN'S DISEASE OF SMALL INTESTINE WITHOUT COMPLICATION (HCC): ICD-10-CM

## 2020-12-06 DIAGNOSIS — K50.00 CROHN'S DISEASE OF SMALL INTESTINE WITHOUT COMPLICATION (HCC): ICD-10-CM

## 2021-01-17 DIAGNOSIS — K50.00 CROHN'S DISEASE OF SMALL INTESTINE WITHOUT COMPLICATION (HCC): ICD-10-CM

## 2021-01-19 ENCOUNTER — VIRTUAL VISIT (OUTPATIENT)
Dept: FAMILY MEDICINE CLINIC | Age: 26
End: 2021-01-19
Payer: COMMERCIAL

## 2021-01-19 DIAGNOSIS — Z76.89 ENCOUNTER TO ESTABLISH CARE: ICD-10-CM

## 2021-01-19 DIAGNOSIS — K50.00 CROHN'S DISEASE OF SMALL INTESTINE WITHOUT COMPLICATION (HCC): ICD-10-CM

## 2021-01-19 DIAGNOSIS — J03.90 TONSILLITIS: Primary | ICD-10-CM

## 2021-01-19 PROCEDURE — 99203 OFFICE O/P NEW LOW 30 MIN: CPT | Performed by: FAMILY MEDICINE

## 2021-01-19 RX ORDER — AMOXICILLIN 500 MG/1
500 CAPSULE ORAL 3 TIMES DAILY
Qty: 21 CAP | Refills: 0 | Status: SHIPPED | OUTPATIENT
Start: 2021-01-19 | End: 2021-01-26

## 2021-01-19 NOTE — PROGRESS NOTES
Washington Davies is a 31 y.o. male who was seen by synchronous (real-time) audio-video technology on 1/19/2021 for Sore Throat, Inflammatory Bowel Disease, and Establish Care        Assessment & Plan:       ICD-10-CM ICD-9-CM    1. Tonsillitis  J03.90 463 amoxicillin (AMOXIL) 500 mg capsule   2. Crohn's disease of small intestine without complication ([facility].)  H25.41 555.0    3. Encounter to establish care  Z76.89 V65.8      Subjective:   Washington Davies is seen to establish care. Tonsillitis: Patient has painful tonsils. Has noticed some septic areas on both tonsils. He denies fever or chills. Has mild odynophagia. He has tried over-the-counter medication and supportive care measures without much relief. He would like some medication for this. I will send in Amoxil. Crohn's disease: Patient with a history of Crohn's disease. He is followed up by the gastroenterologist.  He is on Remicade and budesonide rectal form. He also takes vitamins and probiotics. Has been stable. No diarrhea. He will continue with management as per the specialist.  Health maintenance: Patient declines to have the flu vaccine. Prior to Admission medications    Medication Sig Start Date End Date Taking? Authorizing Provider   amoxicillin (AMOXIL) 500 mg capsule Take 1 Cap by mouth three (3) times daily for 7 days. 1/19/21 1/26/21 Yes Vivian Angulo MD   MULTIVIT-MIN/FOLIC/VIT K/LYCOP (ONE-A-DAY MEN'S MULTIVITAMIN PO) Take  by mouth. Provider, Historical   Omega-3 Fatty Acids (FISH OIL) 300 mg cap Take  by mouth. Provider, Historical   budesonide (UCERIS) 2 mg/actuation foam Insert  into rectum. Provider, Historical   ascorbic acid (VITAMIN C) 500 mg tablet Take  by mouth. Provider, Historical   ergocalciferol (VITAMIN D2) 50,000 unit capsule Take 50,000 Units by mouth every thirty (30) days. Provider, Historical   INFLIXIMAB (REMICADE IV) by IntraVENous route.  Every 2 months    Provider, Historical   Calcium Carbonate-Vit D3-Min (CALTRATE 600+D PLUS MINERALS) 600 mg calcium- 400 unit tab Take  by mouth. Provider, Historical     ROS Review of all systems is negative except as noted above in the HPI. Objective:   No flowsheet data found. Constitutional: [x] Appears well-developed and well-nourished [x] No apparent distress      Mental status: [x] Alert and awake  [x] Oriented to person/place/time [x] Able to follow commands       Pulmonary/Chest: [x] Respiratory effort normal   [x] No visualized signs of difficulty breathing or respiratory distress           Neurological:        [x] No Facial Asymmetry (Cranial nerve 7 motor function) (limited exam due to video visit)                     Psychiatric:       [x] Normal Affect    We discussed the expected course, resolution and complications of the diagnosis(es) in detail. Medication risks, benefits, costs, interactions, and alternatives were discussed as indicated. I advised him to contact the office if his condition worsens, changes or fails to improve as anticipated. He expressed understanding with the diagnosis(es) and plan. Lesa Bai, who was evaluated through a patient-initiated, synchronous (real-time) audio-video encounter, and/or his healthcare decision maker, is aware that it is a billable service, with coverage as determined by his insurance carrier. He provided verbal consent to proceed: Yes, and patient identification was verified. It was conducted pursuant to the emergency declaration under the [facility], [address] [place] [address] authority and the [employer] Act. A caregiver was present when appropriate. Ability to conduct physical exam was limited. I was in the office. The patient was at home.       Carmen Burroughs MD

## 2021-02-28 DIAGNOSIS — K50.00 CROHN'S DISEASE OF SMALL INTESTINE WITHOUT COMPLICATION (HCC): ICD-10-CM
